# Patient Record
Sex: MALE | Race: ASIAN | NOT HISPANIC OR LATINO | Employment: STUDENT | URBAN - METROPOLITAN AREA
[De-identification: names, ages, dates, MRNs, and addresses within clinical notes are randomized per-mention and may not be internally consistent; named-entity substitution may affect disease eponyms.]

---
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
## 2018-01-12 VITALS
HEART RATE: 72 BPM | WEIGHT: 143.13 LBS | HEIGHT: 64 IN | RESPIRATION RATE: 14 BRPM | DIASTOLIC BLOOD PRESSURE: 72 MMHG | TEMPERATURE: 96.2 F | BODY MASS INDEX: 24.43 KG/M2 | SYSTOLIC BLOOD PRESSURE: 118 MMHG

## 2018-01-18 NOTE — PROGRESS NOTES
Assessment    1  Encounter for preventive health examination (V70 0) (Z00 00)    Discussion/Summary    Impression:   No growth, development, elimination, feeding, skin and sleep concerns  no medical problems  Anticipatory guidance addressed as per the history of present illness section  No vaccines needed  He is not on any medications  Chief Complaint  HM physical      History of Present Illness  , 12-18 years Male (Brief): Michelle Martinez presents today for routine health maintenance with his mother  Social History: He lives with his mother, father and sister  His parents are   General Health: The child's health since the last visit is described as good  Dental hygiene: Good  Immunization status: Up to date   had flu vaccine this fall at work  Caregiver concerns:   Caregivers deny concerns regarding nutrition, sleep, behavior, school, development and elimination  Nutrition/Elimination:   Diet:  his current diet is diverse and healthy  Dietary supplements: fluoridated water  No elimination issues are expressed  Sleep:  No sleep issues are reported  Behavior: The child's temperament is described as calm  No behavior issues identified  Health Risks:     Risk factors: no passive smoking exposure, no household substance abuse and no firearms in the house  Risk findings: no tobacco use, no alcohol use, no marijuana use and no illicit drug use  Safety elements used:   safety elements were discussed and are adequate  Weekly activity: 1 hour(s) of exercise per day  Childcare/School: He is in grade 12 in [employer]  School performance has been excellent and tennis/track and works part time  Sports Participation Questions:      Review of Systems    Constitutional: not feeling tired  Eyes: no eye pain and no eyesight problems  ENT: no nasal discharge, no earache, no hearing loss and no sore throat     Cardiovascular: no chest pain, no intermittent leg claudication and no lower extremity edema  Respiratory: no wheezing, no shortness of breath and no cough  Gastrointestinal: no nausea, no constipation and no diarrhea  Genitourinary: no testicular pain and no dysuria  Musculoskeletal: no myalgias and no joint stiffness  Integumentary: no rashes  Neurological: no headache and no fainting  Psychiatric: no depression and no emotional problems  Active Problems    1  's permit physical examination (V70 3) (Z02 4)   2  Need for HPV vaccination (V04 89) (Z23)    Past Medical History    · Need for HPV vaccination (V04 89) (Z23)    Surgical History    · History of Oral Surgery Tooth Extraction [facility]    Family History  Family History    · Family history of Hypertension (V17 49)    Social History    · Denied: History of Drug Use (305 90)   · Never A Smoker   · Never Drank Alcohol   · Sexual Activity Denied    Allergies    1  No Known Drug Allergies    Vitals   Recorded: [id number] 08:47AM   Temperature 97 7 F   Heart Rate 74   Respiration 16   Systolic 186   Diastolic 76   Height 5 ft 4 in   Weight 146 lb 8 oz   BMI Calculated 25 15   BSA Calculated 1 71   BMI Percentile 81 %   2-20 Stature Percentile 3 %   2-20 Weight Percentile 45 %   Pain Scale 0     Physical Exam    Constitutional - General appearance: No acute distress, well appearing and well nourished  Head and Face - Head and face: Normocephalic, atraumatic  Eyes - Conjunctiva and lids: No injection, edema or discharge  Pupils and irises: Equal, round, reactive to light bilaterally  Ears, Nose, Mouth, and Throat - External inspection of ears and nose: Normal without deformities or discharge  Otoscopic examination: Tympanic membranes gray, translucent with good bony landmarks and light reflex  Canals patent without erythema  Hearing: Normal  Nasal mucosa, septum, and turbinates: Normal, no edema or discharge  Oropharynx: Moist mucosa, normal tongue and tonsils without lesions     Neck - Neck: Supple, symmetric, no masses  Thyroid: No thyromegaly  Pulmonary - Respiratory effort: Normal respiratory rate and rhythm, no increased work of breathing  Auscultation of lungs: Clear bilaterally  Cardiovascular - Auscultation of heart: Regular rate and rhythm, normal S1 and S2, no murmur  Pedal pulses: Normal, 2+ bilaterally  Examination of extremities for edema and/or varicosities: Normal    Abdomen - Abdomen: Normal bowel sounds, soft, non-tender, no masses  Liver and spleen: No hepatomegaly or splenomegaly  Lymphatic - Palpation of lymph nodes in neck: No anterior or posterior cervical lymphadenopathy  Musculoskeletal - Gait and station: Normal gait  Evaluation for scoliosis: No scoliosis on exam  Range of motion: Normal  Muscle strength/tone: Normal    Skin - Skin and subcutaneous tissue: No rash or lesions  Palpation of skin and subcutaneous tissue: Normal    Neurologic - Cortical function: Normal  Reflexes: Normal    Psychiatric - judgment and insight: Normal  Orientation to person, place, and time: Normal  Mood and affect: Normal       Results/Data  PHQ-2 Adult Depression Screening [id number] 09:35AM Carmen Ahuja     Test Name Result Flag Reference   PHQ-2 Adult Depression Score 0     Over the last two weeks, how often have you been bothered by any of the following problems? Little interest or pleasure in doing things: Not at all - 0  Feeling down, depressed, or hopeless: Not at all - 0   PHQ-2 Adult Depression Screening Negative         Attending Note  Collaborating Physician Note: Collaborating Physician: I discussed the case with the Advanced Practitioner and reviewed the note and I agree with the Advanced Practitioner note        Signatures   Electronically signed by : NICOL Zhou [facility]; Jan 19 2017  9:42AM EST                       (Author)    Electronically signed by : ELIZABETH Gallegos ; Jan 19 2017  2:28PM EST                       (Author)

## 2018-01-22 VITALS
RESPIRATION RATE: 16 BRPM | SYSTOLIC BLOOD PRESSURE: 112 MMHG | BODY MASS INDEX: 25.01 KG/M2 | DIASTOLIC BLOOD PRESSURE: 76 MMHG | WEIGHT: 146.5 LBS | HEIGHT: 64 IN | TEMPERATURE: 97.7 F | HEART RATE: 74 BPM

## 2019-03-04 ENCOUNTER — OFFICE VISIT (OUTPATIENT)
Dept: FAMILY MEDICINE CLINIC | Facility: CLINIC | Age: 19
End: 2019-03-04

## 2019-03-04 VITALS
BODY MASS INDEX: 24.89 KG/M2 | SYSTOLIC BLOOD PRESSURE: 120 MMHG | RESPIRATION RATE: 16 BRPM | HEIGHT: 64 IN | HEART RATE: 108 BPM | DIASTOLIC BLOOD PRESSURE: 78 MMHG | WEIGHT: 145.8 LBS | TEMPERATURE: 97.5 F

## 2019-03-04 DIAGNOSIS — R05.9 COUGH: Primary | ICD-10-CM

## 2019-03-04 DIAGNOSIS — H61.23 BILATERAL IMPACTED CERUMEN: ICD-10-CM

## 2019-03-04 PROCEDURE — 99213 OFFICE O/P EST LOW 20 MIN: CPT | Performed by: FAMILY MEDICINE

## 2019-03-04 PROCEDURE — 3008F BODY MASS INDEX DOCD: CPT | Performed by: FAMILY MEDICINE

## 2019-03-04 NOTE — PROGRESS NOTES
Chip Elias 2000 male MRN: [id number]    Family Medicine Acute Visit    ASSESSMENT/PLAN  Problem List Items Addressed This Visit        Nervous and Auditory    Bilateral impacted cerumen     - Debrox BID          Relevant Medications    carbamide peroxide (DEBROX) 6 5 % otic solution       Other    Cough - Primary     - Acute productive cough, nasal congestion and sinus pressure likely secondary to viral URI   - Dry cough for 2 months following cold-like symptoms at onset likely residual cough from bronchitis, no red flag signs/symptoms   - Supportive care discussed including nasal decongestant to relieve sinus pressure, adequate hydration and rest   - Advised to return if symptoms persist past 7-10 days or worsen               No future appointments  SUBJECTIVE  CC: Cough (2 months) and Facial Pain      HPI:  Chip Elias is a 19 y o  male who presents for evaluation cough and cold-like symptoms  He states that 2 months ago he had cold-like symptoms with nasal congestion and sore throat, and then developed a cough  He states that that cough was a dry cough and has been persistent over the past 2 months  A couple of days ago he began developing nasal congestion, sinus pressure, subjective fever and fatigue  He also notes he is now coughing up yellow-green mucus  When it first began he also had eye redness and discharge, which is now resolved  He admits to headaches with the sinus congestion and pressure  He denies any known sick contacts although attends college and says it is possible  He states he is otherwise healthy and up-to-date on all of his immunizations, although he is unsure if he got a flu shot this year  He denies any recent travel  Review of Systems   Constitutional: Positive for fatigue and fever (Subjective )  Negative for activity change, appetite change and chills  HENT: Positive for congestion, postnasal drip, rhinorrhea, sinus pressure, sinus pain and sneezing   Negative for ear discharge, ear pain, facial swelling, sore throat and trouble swallowing  Eyes: Positive for discharge  Negative for pain, redness and itching  Respiratory: Positive for cough  Negative for chest tightness, shortness of breath and wheezing  Cardiovascular: Negative for chest pain  Gastrointestinal: Negative for abdominal pain, constipation, diarrhea, nausea and vomiting  Genitourinary: Negative for decreased urine volume  Skin: Negative for rash  Neurological: Positive for headaches  Negative for dizziness, weakness, light-headedness and numbness  Hematological: Negative for adenopathy  Historical Information   The patient history was reviewed as follows:    No past medical history on file  No past surgical history on file  No family history on file  Social History   Social History     Substance and Sexual Activity   Alcohol Use Not on file     Social History     Substance and Sexual Activity   Drug Use Not on file     Social History     Tobacco Use   Smoking Status Not on file       Medications:     Current Outpatient Medications:     carbamide peroxide (DEBROX) 6 5 % otic solution, Administer 5 drops into both ears 2 (two) times a day, Disp: 15 mL, Rfl: 0  No Known Allergies    OBJECTIVE    Vitals:   Vitals:    03/04/19 1633   BP: 120/78   BP Location: Left arm   Patient Position: Sitting   Cuff Size: Adult   Pulse: (!) 108   Resp: 16   Temp: 97 5 °F (36 4 °C)   TempSrc: Tympanic   Weight: 66 1 kg (145 lb 12 8 oz)   Height: 5' 4" (1 626 m)           Physical Exam   Constitutional: He is oriented to person, place, and time  He appears well-developed and well-nourished  No distress  HENT:   Head: Normocephalic and atraumatic  Right Ear: External ear normal    Left Ear: External ear normal    Nose: Nose normal    Mouth/Throat: Oropharynx is clear and moist  No oropharyngeal exudate     Cerumen impaction bilaterally, posterior pharynx slightly erythematous without exudate, nasal turbinates erythematous and boggy bilaterally without purulent discharge     Eyes: Pupils are equal, round, and reactive to light  Conjunctivae and EOM are normal  Right eye exhibits no discharge  Left eye exhibits no discharge  Neck: Normal range of motion  Neck supple  Cardiovascular: Normal rate, regular rhythm, normal heart sounds and intact distal pulses  No murmur heard  Pulmonary/Chest: Effort normal and breath sounds normal  No stridor  No respiratory distress  He has no wheezes  He has no rales  Lymphadenopathy:     He has no cervical adenopathy  Neurological: He is alert and oriented to person, place, and time  He exhibits normal muscle tone  Skin: Skin is warm  Capillary refill takes less than 2 seconds  No rash noted  He is not diaphoretic  Psychiatric: He has a normal mood and affect  His behavior is normal    Vitals reviewed         Sumi Pacheco DO, PGY-2  [facility]   3/4/2019

## 2019-03-04 NOTE — ASSESSMENT & PLAN NOTE
- Acute productive cough, nasal congestion and sinus pressure likely secondary to viral URI   - Dry cough for 2 months following cold-like symptoms at onset likely residual cough from bronchitis, no red flag signs/symptoms   - Supportive care discussed including nasal decongestant to relieve sinus pressure, adequate hydration and rest   - Advised to return if symptoms persist past 7-10 days or worsen

## 2022-12-29 ENCOUNTER — OFFICE VISIT (OUTPATIENT)
Dept: FAMILY MEDICINE CLINIC | Facility: CLINIC | Age: 22
End: 2022-12-29

## 2022-12-29 VITALS
DIASTOLIC BLOOD PRESSURE: 95 MMHG | RESPIRATION RATE: 18 BRPM | WEIGHT: 172.6 LBS | HEART RATE: 88 BPM | BODY MASS INDEX: 28.76 KG/M2 | SYSTOLIC BLOOD PRESSURE: 139 MMHG | TEMPERATURE: 97.7 F | OXYGEN SATURATION: 97 % | HEIGHT: 65 IN

## 2022-12-29 DIAGNOSIS — R03.0 ELEVATED BLOOD PRESSURE READING WITHOUT DIAGNOSIS OF HYPERTENSION: ICD-10-CM

## 2022-12-29 DIAGNOSIS — Z11.4 SCREENING FOR HIV WITHOUT PRESENCE OF RISK FACTORS: ICD-10-CM

## 2022-12-29 DIAGNOSIS — Z11.59 ENCOUNTER FOR HEPATITIS C SCREENING TEST FOR LOW RISK PATIENT: ICD-10-CM

## 2022-12-29 DIAGNOSIS — Z23 NEED FOR TDAP VACCINATION: ICD-10-CM

## 2022-12-29 DIAGNOSIS — R05.3 CHRONIC COUGH: ICD-10-CM

## 2022-12-29 DIAGNOSIS — E66.3 OVERWEIGHT (BMI 25.0-29.9): ICD-10-CM

## 2022-12-29 DIAGNOSIS — F41.9 ANXIETY: ICD-10-CM

## 2022-12-29 DIAGNOSIS — Z00.00 ANNUAL PHYSICAL EXAM: Primary | ICD-10-CM

## 2022-12-29 DIAGNOSIS — Z92.29 HEPATITIS A AND HEPATITIS B VACCINE ADMINISTERED: ICD-10-CM

## 2022-12-29 RX ORDER — CITALOPRAM 20 MG/1
20 TABLET ORAL DAILY
Qty: 90 TABLET | Refills: 0 | Status: SHIPPED | OUTPATIENT
Start: 2022-12-29

## 2022-12-29 NOTE — ASSESSMENT & PLAN NOTE
Patient reports elevated home BP but does not remember BP reading  - Denies any family history of HTN    Assessment: elevated BP today likely secondary to anxiety   No prior record of HTN in the past     Plan:  - Will defer starting antihypertensive at this time  -  of weight loss and lifestyle improvement  - Follow-up in 6 weeks to recheck BP    BP Readings from Last 3 Encounters:   12/29/22 139/95   03/04/19 120/78   08/15/17 118/72

## 2022-12-29 NOTE — PATIENT INSTRUCTIONS

## 2022-12-29 NOTE — ASSESSMENT & PLAN NOTE
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 [place] and is struggling this year even though he is actively seeking academic help  - Family: Both parents are still alive and well without problem at home  However, patient is anxious that his school performance might be a reason for disappointing his family   - Relationship: currently in an on-going 5-years relationship with his girlfriend, however ran into problem in the past year but has been trying to work things out   - Panic attack: Patient endorses intermittent episodes of difficulty breathing, racing thoughts, and hand numbness/tingling  These episodes are mostly triggered by thoughts of school, occurring a few times per month, lasts approx 5-15 minutes, and resolves when he is able to calm himself down  Assessment: Normal psych exam with good attention, judgment, and insight  Mood and speech is appropriate  ENA-7 score 10 (moderate) on 12/29  Plan:  - Trial Celexa 20mg QD for 6 weeks, likely will need to be increased to 40mg if patient without side-effects  - referral to psych  - Patient to call back in 2 weeks to report any side-effects or worsening symptoms  - Follow-up in 6 weeks to re-evaluate    ENA-7 Flowsheet Screening    Flowsheet Row Most Recent Value   Over the last 2 weeks, how often have you been bothered by any of the following problems?     Feeling nervous, anxious, or on edge 2   Not being able to stop or control worrying 2   Worrying too much about different things 1  [School and family]   Trouble relaxing 2   Being so restless that it is hard to sit still 1   Becoming easily annoyed or irritable 0   Feeling afraid as if something awful might happen 2   ENA-7 Total Score 10

## 2022-12-29 NOTE — ASSESSMENT & PLAN NOTE
Immunization:  - COVID -- 04/10/2021, 01/29/2022,   - TDaP -- 2000, 02/17/1999, 04/09/1999    Screening: discussed each screening test below with patient, which patient expresses understanding and is agreeable to plan  - Depression -- negative  - Hypertension -- mildly elevated /95 today  - HIV -- ordered this visit  - Hep C -- ordered this visit  - Nicotine/EtOH/Drugs use -- never smoker  Social EtOH   Never drugs  - T2DM -- HgbA1c ordered for BMI > 25

## 2022-12-29 NOTE — ASSESSMENT & PLAN NOTE
Patient reports chronic intermittent non-productive cough started since 2019, which is worsened when he is stressed/anxious but not associated with environmental factors  - Denies fever, chill, chest pain, chest tightness, shortness of breath, or rashes  Cough occurs approx a few times per day and resolves spontaneously  Does not wake him up at night   - Has not tried any medication/treatment    Lung exam: Normal respiratory effort without distress  Lungs clear to auscultation bilaterally without wheezing or crackle  No oropharyngeal erythema or cobblestoning  Nose clear without enlarged turbinates or rhinorrhea  Assessment: Symptoms suspicious for vocal cord dysfunction, however cannot r/o stress-induced asthma  Low suspicion for postnasal drip, GERD, or chronic bronchitis at this time      Plan:  - PFT to r/o asthma  -  on breathing technique and staying calm during anxiety episode  - Follow up in 6 weeks to re-evaluated

## 2022-12-29 NOTE — PROGRESS NOTES
[facility]    NAME: Demi Dear  AGE: 23 y o  SEX: male  : 2000     DATE: 2022     Assessment and Plan:     1  Annual physical exam  Assessment & Plan:  Immunization:  - Mayco Terrydy -- 04/10/2021, 2022,   - TDaP -- 1998, 1999, 1999    Screening: discussed each screening test below with patient, which patient expresses understanding and is agreeable to plan  - Depression -- negative  - Hypertension -- mildly elevated /95 today  - HIV -- ordered this visit  - Hep C -- ordered this visit  - Nicotine/EtOH/Drugs use -- never smoker  Social EtOH  Never drugs  - T2DM -- HgbA1c ordered for BMI > 25      2  Chronic cough  Assessment & Plan:  Patient reports chronic intermittent non-productive cough started since , which is worsened when he is stressed/anxious but not associated with environmental factors  - Denies fever, chill, chest pain, chest tightness, shortness of breath, or rashes  Cough occurs approx a few times per day and resolves spontaneously  Does not wake him up at night   - Has not tried any medication/treatment    Lung exam: Normal respiratory effort without distress  Lungs clear to auscultation bilaterally without wheezing or crackle  No oropharyngeal erythema or cobblestoning  Nose clear without enlarged turbinates or rhinorrhea  Assessment: Symptoms suspicious for vocal cord dysfunction, however cannot r/o stress-induced asthma  Low suspicion for postnasal drip, GERD, or chronic bronchitis at this time  Plan:  - PFT to r/o asthma  -  on breathing technique and staying calm during anxiety episode  - Follow up in 6 weeks to re-evaluated    Orders:  -     Complete PFT with post bronchodilator; Future    3   Elevated blood pressure reading without diagnosis of hypertension  Assessment & Plan:  Patient reports elevated home BP but does not remember BP reading  - Denies any family history of HTN    Assessment: elevated BP today likely secondary to anxiety  No prior record of HTN in the past     Plan:  - Will defer starting antihypertensive at this time  -  of weight loss and lifestyle improvement  - Follow-up in 6 weeks to recheck BP    BP Readings from Last 3 Encounters:   12/29/22 139/95   03/04/19 120/78   08/15/17 118/72         4  Anxiety  Assessment & Plan:  Reports anxiety since 2018 (sophomore year in college) but have been worsened since last 4 months  - Anxiety mostly relates to school, family, and personal relationship  - School: 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 [place] and is struggling this year even though he is actively seeking academic help  - Family: Both parents are still alive and well without problem at home  However, patient is anxious that his school performance might be a reason for disappointing his family   - Relationship: currently in an on-going 5-years relationship with his girlfriend, however ran into problem in the past year but has been trying to work things out   - Panic attack: Patient endorses intermittent episodes of difficulty breathing, racing thoughts, and hand numbness/tingling  These episodes are mostly triggered by thoughts of school, occurring a few times per month, lasts approx 5-15 minutes, and resolves when he is able to calm himself down  Assessment: Normal psych exam with good attention, judgment, and insight  Mood and speech is appropriate  ENA-7 score 10 (moderate) on 12/29      Plan:  - Trial Celexa 20mg QD for 6 weeks, likely will need to be increased to 40mg if patient without side-effects  - referral to psych  - Patient to call back in 2 weeks to report any side-effects or worsening symptoms  - Follow-up in 6 weeks to re-evaluate    ENA-7 Flowsheet Screening    Flowsheet Row Most Recent Value   Over the last 2 weeks, how often have you been bothered by any of the following problems? Feeling nervous, anxious, or on edge 2   Not being able to stop or control worrying 2   Worrying too much about different things 1  [School and family]   Trouble relaxing 2   Being so restless that it is hard to sit still 1   Becoming easily annoyed or irritable 0   Feeling afraid as if something awful might happen 2   ENA-7 Total Score 10          Orders:  -     citalopram (CeleXA) 20 mg tablet; Take 1 tablet (20 mg total) by mouth daily  -     Ambulatory Referral to Psychology; Future    5  Screening for HIV without presence of risk factors  -     HIV 1/2 AG/AB W REFLEX [facility] and [facility] only; Future    6  Encounter for hepatitis C screening test for low risk patient  -     Hepatitis C antibody; Future    7  Overweight (BMI 25 0-29 9)  -     Basic metabolic panel; Future  -     HEMOGLOBIN A1C W/ EAG ESTIMATION; Future  -     Lipid Panel with Direct LDL reflex; Future  -     TSH, 3rd generation with Free T4 reflex; Future    8  Need for Tdap vaccination  -     TDAP VACCINE GREATER THAN OR EQUAL TO 6YO IM    9  Hepatitis A and hepatitis B vaccine administered  -     HEPATITIS A VACCINE ADULT IM        Immunizations and preventive care screenings were discussed with patient today  Appropriate education was printed on patient's after visit summary  Counseling:  Alcohol/drug use: discussed moderation in alcohol intake, the recommendations for healthy alcohol use, and avoidance of illicit drug use  Dental Health: discussed importance of regular tooth brushing, flossing, and dental visits  Injury prevention: discussed safety/seat belts, safety helmets, smoke detectors, carbon dioxide detectors, and smoking near bedding or upholstery  Sexual health: discussed sexually transmitted diseases, partner selection, use of condoms, avoidance of unintended pregnancy, and contraceptive alternatives  Exercise: the importance of regular exercise/physical activity was discussed   Recommend exercise 3-5 times per week for at least 30 minutes  BMI Counseling: Body mass index is 28 46 kg/m²  The BMI is above normal  Nutrition recommendations include decreasing portion sizes, encouraging healthy choices of fruits and vegetables, decreasing fast food intake, consuming healthier snacks, limiting drinks that contain sugar, moderation in carbohydrate intake, increasing intake of lean protein, reducing intake of saturated and trans fat and reducing intake of cholesterol  Exercise recommendations include moderate physical activity 150 minutes/week, vigorous physical activity 75 minutes/week, exercising 3-5 times per week, obtaining a gym membership and strength training exercises  No pharmacotherapy was ordered  Rationale for BMI follow-up plan is due to patient being overweight or obese  Depression Screening and Follow-up Plan: Patient was screened for depression during today's encounter  They screened negative with a PHQ-2 score of 0  No follow-ups on file  Chief Complaint:     Chief Complaint   Patient presents with   • Physical Exam   • Anxiety   • Cough      History of Present Illness:     Adult Annual Physical   Patient here for a comprehensive physical exam  The patient reports problems - anxiety, chronic cough, and elevated BP  Diet and Physical Activity  Diet/Nutrition: well balanced diet  Exercise: no formal exercise  Depression Screening  PHQ-2/9 Depression Screening    Little interest or pleasure in doing things: 0 - not at all  Feeling down, depressed, or hopeless: 0 - not at all  PHQ-2 Score: 0  PHQ-2 Interpretation: Negative depression screen       General Health  Sleep: sleeps well  Hearing: normal - bilateral   Vision: no vision problems and wears glasses  Dental: regular dental visits   Health  History of STDs?: no      Review of Systems:     Review of Systems   Constitutional: Negative for chills and fever  HENT: Negative for ear pain and trouble swallowing      Eyes: Negative for pain, discharge and redness  Respiratory: Negative for shortness of breath  Cardiovascular: Negative for chest pain  Gastrointestinal: Negative for abdominal pain  Endocrine: Negative for cold intolerance and heat intolerance  Genitourinary: Negative for dysuria and hematuria  Musculoskeletal: Negative for myalgias  Skin: Negative for rash  Neurological: Negative for seizures and syncope  Psychiatric/Behavioral: Negative for behavioral problems  The patient is nervous/anxious  Past Medical History:     No past medical history on file  Past Surgical History:     Past Surgical History:   Procedure Laterality Date   • WISDOM TOOTH EXTRACTION        Social History:     Social History     Socioeconomic History   • Marital status: Single     Spouse name: None   • Number of children: None   • Years of education: None   • Highest education level: None   Occupational History   • None   Tobacco Use   • Smoking status: Never   • Smokeless tobacco: None   Substance and Sexual Activity   • Alcohol use: Never   • Drug use: None   • Sexual activity: None   Other Topics Concern   • None   Social History Narrative    Denied: History of drug use - As per [employer]    Sexual activity denied - As per [employer]      Social Determinants of Health     Financial Resource Strain: Low Risk    • Difficulty of Paying Living Expenses: Not hard at all   Food Insecurity: No Food Insecurity   • Worried About Running Out of Food in the Last Year: Never true   • Ran Out of Food in the Last Year: Never true   Transportation Needs: No Transportation Needs   • Lack of Transportation (Medical): No   • Lack of Transportation (Non-Medical): No   Physical Activity: Inactive   • Days of Exercise per Week: 0 days   • Minutes of Exercise per Session: 0 min   Stress: No Stress Concern Present   • Feeling of Stress :  Only a little   Social Connections: Not on file   Intimate Partner Violence: Not At Risk   • Fear of Current or Ex-Partner: No   • Emotionally Abused: No   • Physically Abused: No   • Sexually Abused: No   Housing Stability: Low Risk    • Unable to Pay for Housing in the Last Year: No   • Number of Places Lived in the Last Year: 1   • Unstable Housing in the Last Year: No      Family History:     Family History   Problem Relation Age of Onset   • Hypertension Family       Current Medications:     Current Outpatient Medications   Medication Sig Dispense Refill   • citalopram (CeleXA) 20 mg tablet Take 1 tablet (20 mg total) by mouth daily 90 tablet 0   • carbamide peroxide (DEBROX) 6 5 % otic solution Administer 5 drops into both ears 2 (two) times a day (Patient not taking: Reported on 12/29/2022) 15 mL 0     No current facility-administered medications for this visit  Allergies:     No Known Allergies   Physical Exam:     /95   Pulse 88   Temp 97 7 °F (36 5 °C)   Resp 18   Ht 5' 5 3" (1 659 m)   Wt 78 3 kg (172 lb 9 6 oz)   SpO2 97%   BMI 28 46 kg/m²     Physical Exam  Vitals and nursing note reviewed  Constitutional:       General: He is not in acute distress  Appearance: Normal appearance  He is normal weight  He is not ill-appearing, toxic-appearing or diaphoretic  HENT:      Head: Normocephalic and atraumatic  Right Ear: External ear normal       Left Ear: External ear normal       Nose: Nose normal       Mouth/Throat:      Pharynx: Oropharynx is clear  Eyes:      Extraocular Movements: Extraocular movements intact  Conjunctiva/sclera: Conjunctivae normal    Cardiovascular:      Rate and Rhythm: Normal rate  Pulses: Normal pulses  Pulmonary:      Effort: Pulmonary effort is normal  No respiratory distress  Abdominal:      General: There is no distension  Musculoskeletal:         General: Normal range of motion  Cervical back: Normal range of motion and neck supple  Skin:     Capillary Refill: Capillary refill takes less than 2 seconds        Coloration: Skin is not jaundiced  Neurological:      Mental Status: He is alert and oriented to person, place, and time  Mental status is at baseline     Psychiatric:         Mood and Affect: Mood normal          Behavior: Behavior normal           Miko MD Marni   [address]

## 2022-12-30 ENCOUNTER — APPOINTMENT (OUTPATIENT)
Dept: LAB | Age: 22
End: 2022-12-30

## 2022-12-30 DIAGNOSIS — Z11.4 SCREENING FOR HIV WITHOUT PRESENCE OF RISK FACTORS: ICD-10-CM

## 2022-12-30 DIAGNOSIS — Z11.59 ENCOUNTER FOR HEPATITIS C SCREENING TEST FOR LOW RISK PATIENT: ICD-10-CM

## 2022-12-30 DIAGNOSIS — E66.3 OVERWEIGHT (BMI 25.0-29.9): ICD-10-CM

## 2022-12-31 LAB
ANION GAP SERPL CALCULATED.3IONS-SCNC: 7 MMOL/L (ref 4–13)
BUN SERPL-MCNC: 16 MG/DL (ref 5–25)
CALCIUM SERPL-MCNC: 9.8 MG/DL (ref 8.3–10.1)
CHLORIDE SERPL-SCNC: 104 MMOL/L (ref 96–108)
CHOLEST SERPL-MCNC: 180 MG/DL
CO2 SERPL-SCNC: 27 MMOL/L (ref 21–32)
CREAT SERPL-MCNC: 0.87 MG/DL (ref 0.6–1.3)
EST. AVERAGE GLUCOSE BLD GHB EST-MCNC: 111 MG/DL
GFR SERPL CREATININE-BSD FRML MDRD: 120 ML/MIN/1.73SQ M
GLUCOSE P FAST SERPL-MCNC: 91 MG/DL (ref 65–99)
HBA1C MFR BLD: 5.5 %
HCV AB SER QL: NORMAL
HDLC SERPL-MCNC: 52 MG/DL
LDLC SERPL CALC-MCNC: 93 MG/DL (ref 0–100)
POTASSIUM SERPL-SCNC: 3.7 MMOL/L (ref 3.5–5.3)
SODIUM SERPL-SCNC: 138 MMOL/L (ref 135–147)
TRIGL SERPL-MCNC: 173 MG/DL
TSH SERPL DL<=0.05 MIU/L-ACNC: 1.04 UIU/ML (ref 0.45–4.5)

## 2023-01-04 LAB
HIV 1+2 AB+HIV1 P24 AG SERPL QL IA: NORMAL
HIV 2 AB SERPL QL IA: NORMAL
HIV1 AB SERPL QL IA: NORMAL
HIV1 P24 AG SERPL QL IA: NORMAL

## 2023-01-07 ENCOUNTER — HOSPITAL ENCOUNTER (OUTPATIENT)
Dept: PULMONOLOGY | Facility: HOSPITAL | Age: 23
Discharge: HOME | End: 2023-01-07

## 2023-01-07 DIAGNOSIS — R05.3 CHRONIC COUGH: ICD-10-CM

## 2023-01-07 RX ORDER — ALBUTEROL SULFATE 2.5 MG/3ML
2.5 SOLUTION RESPIRATORY (INHALATION) ONCE
Status: COMPLETED | OUTPATIENT
Start: 2023-01-07 | End: 2023-01-07

## 2023-01-07 RX ADMIN — ALBUTEROL SULFATE 2.5 MG: 2.5 SOLUTION RESPIRATORY (INHALATION) at 09:32
